# Patient Record
Sex: FEMALE | Race: WHITE | NOT HISPANIC OR LATINO | Employment: OTHER | URBAN - METROPOLITAN AREA
[De-identification: names, ages, dates, MRNs, and addresses within clinical notes are randomized per-mention and may not be internally consistent; named-entity substitution may affect disease eponyms.]

---

## 2018-03-07 ENCOUNTER — IMPORTED ENCOUNTER (OUTPATIENT)
Dept: URBAN - METROPOLITAN AREA CLINIC 43 | Facility: CLINIC | Age: 83
End: 2018-03-07

## 2018-03-07 PROBLEM — H40.1134: Noted: 2018-03-07

## 2018-03-07 PROBLEM — H21.562: Noted: 2018-03-07

## 2018-03-22 ENCOUNTER — IMPORTED ENCOUNTER (OUTPATIENT)
Dept: URBAN - METROPOLITAN AREA CLINIC 43 | Facility: CLINIC | Age: 83
End: 2018-03-22

## 2018-04-18 ENCOUNTER — IMPORTED ENCOUNTER (OUTPATIENT)
Dept: URBAN - METROPOLITAN AREA CLINIC 43 | Facility: CLINIC | Age: 83
End: 2018-04-18

## 2018-08-08 ENCOUNTER — IMPORTED ENCOUNTER (OUTPATIENT)
Dept: URBAN - METROPOLITAN AREA CLINIC 43 | Facility: CLINIC | Age: 83
End: 2018-08-08

## 2018-08-08 PROBLEM — H40.1134: Noted: 2018-08-08

## 2019-03-20 ENCOUNTER — IMPORTED ENCOUNTER (OUTPATIENT)
Dept: URBAN - METROPOLITAN AREA CLINIC 43 | Facility: CLINIC | Age: 84
End: 2019-03-20

## 2019-03-20 PROBLEM — H40.1134: Noted: 2019-03-20

## 2019-08-14 ENCOUNTER — IMPORTED ENCOUNTER (OUTPATIENT)
Dept: URBAN - METROPOLITAN AREA CLINIC 43 | Facility: CLINIC | Age: 84
End: 2019-08-14

## 2019-08-14 PROBLEM — H40.1134: Noted: 2019-08-14

## 2020-04-19 ASSESSMENT — KERATOMETRY
OS_AXISANGLE2_DEGREES: 126
OS_AXISANGLE2_DEGREES: 92
OS_AXISANGLE_DEGREES: 2
OD_K1POWER_DIOPTERS: 45.5
OS_AXISANGLE_DEGREES: 36
OS_K1POWER_DIOPTERS: 43.25
OD_AXISANGLE2_DEGREES: 63
OD_AXISANGLE_DEGREES: 150
OS_K2POWER_DIOPTERS: 42.25
OS_K2POWER_DIOPTERS: 41.25
OD_AXISANGLE2_DEGREES: 60
OD_K1POWER_DIOPTERS: 45.5
OD_AXISANGLE_DEGREES: 153
OS_K1POWER_DIOPTERS: 43.75
OD_K2POWER_DIOPTERS: 44.25
OD_K2POWER_DIOPTERS: 44

## 2020-04-19 ASSESSMENT — VISUAL ACUITY
OD_SC: J3
OS_CC: 20/200
OD_CC: 20/25
OD_CC: 20/20 -2
OS_SC: 20/200
OD_CC: 20/20 -2
OD_SC: 20/50+2
OS_SC: 20/400
OD_SC: 20/40
OS_CC: 20/200
OS_CC: 20/400
OD_CC: 20/20-2

## 2020-04-19 ASSESSMENT — TONOMETRY
OD_IOP_MMHG: 23.0
OD_IOP_MMHG: 12.0
OS_IOP_MMHG: 11.0
OS_IOP_MMHG: 12.0
OS_IOP_MMHG: 20.0
OD_IOP_MMHG: 17.0
OS_IOP_MMHG: 17.0
OD_IOP_MMHG: 13.0
OS_IOP_MMHG: 25.0
OD_IOP_MMHG: 16.0

## 2020-09-08 NOTE — PATIENT DISCUSSION
Continue: [address] (brinzolamide-brimonidine): drops,suspension: 1-0.2% 1 drop twice a day into both eyes

## 2020-09-08 NOTE — PATIENT DISCUSSION
CATARACT DISCUSSION: I have explained to the patient that the cataract is affecting the quality of vision and that cataract surgery may eventually be required. IOL calculations on return.  No testing before calcs

## 2020-09-08 NOTE — PATIENT DISCUSSION
GLAUCOMA:  I have talked with the patient about my impressions, explained our treatment plan, and have answered all questions and patient understands. Continue present eye drops.  Patient to follow up in 6-7 months, VFs

## 2021-04-27 ENCOUNTER — ESTABLISHED COMPREHENSIVE EXAM (OUTPATIENT)
Dept: URBAN - METROPOLITAN AREA CLINIC 32 | Facility: CLINIC | Age: 86
End: 2021-04-27

## 2021-04-27 DIAGNOSIS — Z96.1: ICD-10-CM

## 2021-04-27 DIAGNOSIS — H40.1134: ICD-10-CM

## 2021-04-27 PROCEDURE — 92015 DETERMINE REFRACTIVE STATE: CPT

## 2021-04-27 PROCEDURE — 92014 COMPRE OPH EXAM EST PT 1/>: CPT

## 2021-04-27 PROCEDURE — 92133 CPTRZD OPH DX IMG PST SGM ON: CPT

## 2021-04-27 ASSESSMENT — VISUAL ACUITY
OU_CC: J5
OD_CC: 20/20-2

## 2021-04-27 ASSESSMENT — KERATOMETRY
OD_K2POWER_DIOPTERS: 44
OS_K2POWER_DIOPTERS: 41.25
OD_AXISANGLE2_DEGREES: 63
OS_K1POWER_DIOPTERS: 43.75
OD_AXISANGLE_DEGREES: 153
OD_K1POWER_DIOPTERS: 45.5
OS_AXISANGLE2_DEGREES: 126
OS_AXISANGLE_DEGREES: 36

## 2021-04-27 ASSESSMENT — TONOMETRY
OS_IOP_MMHG: 22
OD_IOP_MMHG: 16
OS_IOP_MMHG: 21

## 2021-08-31 NOTE — PATIENT DISCUSSION
Continue: Felix Mathis (brinzolamide-brimonidine): drops,suspension: 1-0.2% 1 drop twice a day into both eyes

## 2022-04-28 ENCOUNTER — COMPREHENSIVE EXAM (OUTPATIENT)
Dept: URBAN - METROPOLITAN AREA CLINIC 32 | Facility: CLINIC | Age: 87
End: 2022-04-28

## 2022-04-28 DIAGNOSIS — H40.1134: ICD-10-CM

## 2022-04-28 PROCEDURE — 92014 COMPRE OPH EXAM EST PT 1/>: CPT

## 2022-04-28 ASSESSMENT — KERATOMETRY
OD_K1POWER_DIOPTERS: 45.5
OD_AXISANGLE_DEGREES: 153
OS_K1POWER_DIOPTERS: 43.75
OS_AXISANGLE2_DEGREES: 126
OD_K2POWER_DIOPTERS: 44
OD_AXISANGLE2_DEGREES: 63
OS_AXISANGLE_DEGREES: 36
OS_K2POWER_DIOPTERS: 41.25

## 2022-04-28 ASSESSMENT — TONOMETRY
OS_IOP_MMHG: 36
OD_IOP_MMHG: 21

## 2022-04-28 ASSESSMENT — VISUAL ACUITY: OD_CC: 20/20

## 2022-06-01 ASSESSMENT — KERATOMETRY
OS_AXISANGLE_DEGREES: 36
OD_K1POWER_DIOPTERS: 45.5
OS_K1POWER_DIOPTERS: 43.75
OD_AXISANGLE_DEGREES: 153
OS_K2POWER_DIOPTERS: 41.25
OD_K2POWER_DIOPTERS: 44
OD_AXISANGLE2_DEGREES: 63
OS_AXISANGLE2_DEGREES: 126

## 2022-06-02 ENCOUNTER — PREPPED CHART (OUTPATIENT)
Dept: URBAN - METROPOLITAN AREA CLINIC 32 | Facility: CLINIC | Age: 87
End: 2022-06-02

## 2022-06-02 DIAGNOSIS — H40.1134: ICD-10-CM

## 2022-06-02 PROCEDURE — 99213 OFFICE O/P EST LOW 20 MIN: CPT

## 2022-06-02 RX ORDER — BIMATOPROST 0.1 MG/ML
1 SOLUTION/ DROPS OPHTHALMIC EVERY EVENING
Start: 2022-06-02

## 2022-06-02 ASSESSMENT — TONOMETRY
OD_IOP_MMHG: 20
OD_IOP_MMHG: 21
OS_IOP_MMHG: 32
OS_IOP_MMHG: 31

## 2022-06-02 ASSESSMENT — VISUAL ACUITY
OD_SC: 20/50-2
OS_SC: CF 3FT

## 2022-07-19 ENCOUNTER — FOLLOW UP (OUTPATIENT)
Dept: URBAN - METROPOLITAN AREA CLINIC 32 | Facility: CLINIC | Age: 87
End: 2022-07-19

## 2022-07-19 DIAGNOSIS — Z96.1: ICD-10-CM

## 2022-07-19 DIAGNOSIS — H40.1134: ICD-10-CM

## 2022-07-19 PROCEDURE — 92012 INTRM OPH EXAM EST PATIENT: CPT

## 2022-07-19 PROCEDURE — 92015 DETERMINE REFRACTIVE STATE: CPT

## 2022-07-19 ASSESSMENT — VISUAL ACUITY
OD_CC: 20/20-1
OS_CC: CF 3FT

## 2022-07-19 ASSESSMENT — KERATOMETRY
OD_AXISANGLE2_DEGREES: 63
OD_K1POWER_DIOPTERS: 45.5
OS_AXISANGLE_DEGREES: 36
OS_K1POWER_DIOPTERS: 43.75
OS_AXISANGLE2_DEGREES: 126
OD_K2POWER_DIOPTERS: 44
OD_AXISANGLE_DEGREES: 153
OS_K2POWER_DIOPTERS: 41.25

## 2022-07-19 ASSESSMENT — TONOMETRY
OD_IOP_MMHG: 11
OS_IOP_MMHG: 23

## 2022-09-16 NOTE — PATIENT DISCUSSION
She has new glasses from [employer]. They aren't really helping. The cataract is visually significant OD with some glaucoma. OS is visually significant also. Because of the narrow angle the IOP should improved with surgery.

## 2022-09-16 NOTE — PATIENT DISCUSSION
Discussed follow up care options. Recommend continued care with Dr. Kaila Alvarez for Cataract/Glaucoma surgical evaluation.